# Patient Record
Sex: MALE | Race: WHITE | Employment: FULL TIME | URBAN - NONMETROPOLITAN AREA
[De-identification: names, ages, dates, MRNs, and addresses within clinical notes are randomized per-mention and may not be internally consistent; named-entity substitution may affect disease eponyms.]

---

## 2019-02-22 ENCOUNTER — HOSPITAL ENCOUNTER (OUTPATIENT)
Age: 64
Setting detail: SPECIMEN
Discharge: HOME | End: 2019-02-22
Payer: COMMERCIAL

## 2019-02-22 PROCEDURE — 88305 TISSUE EXAM BY PATHOLOGIST: CPT

## 2020-01-07 ENCOUNTER — HOSPITAL ENCOUNTER (OUTPATIENT)
Dept: AUDIOLOGY | Age: 65
Discharge: HOME | End: 2020-01-07
Payer: MEDICARE

## 2020-01-07 PROCEDURE — 92567 TYMPANOMETRY: CPT | Performed by: AUDIOLOGIST

## 2020-01-07 PROCEDURE — 92557 COMPREHENSIVE HEARING TEST: CPT | Performed by: AUDIOLOGIST

## 2020-01-07 NOTE — PROGRESS NOTES
AUDIOLOGICAL EVALUATION      REASON FOR TESTING:  Patient reports constant bilateral tinnitus that is getting worse. He notes possible hearing loss as well. He denies any otalgia, aural pressure and vertigo. A history of occupational noise exposure was reported. Mr. Olimpia Cutler wears hearing protection devices now, but did not earlier in his career. OTOSCOPY: WNL for both ears. AUDIOGRAM        Reliability: Good  Audiometer Used:  GSI-61      COMMENTS: Mild, sloping to severe sensorineural hearing loss for the left ear. Moderate, sloping to severe sensorineural hearing loss for the right ear. Asymmetrical hearing at 250Hz-2KHz, with the right ear being worse. Asymmetrical speech discrimination ability as well. Speech discrimination ability is excellent at 100% for the left ear and fair at 76% for the right ear. Tympanometry revealed normal peak pressure and normal middle ear compliance for both ears. RECOMMENDATION(S):   1- ENT consult and imaging are recommended due to the asymmetrical sensorineural hearing loss in order to rule out a retrocochlear lesion. The patient declined ENT consult and further testing. 2- Binaural amplification is recommended. Given that the patient declines the abovementioned recommendation, he will need to sign a waiver of medical clearance. Discussed hearing aid options with the patient and his wife. Reviewed styles and technology levels. Decided on [employer] M90 RT RICs in P5 color with #2 receivers. Hearing aid fitting is scheduled for 1/30/20.  3- Annual audiometry for monitoring purposes or sooner if any changes are noted in hearing ability.

## 2020-01-30 ENCOUNTER — HOSPITAL ENCOUNTER (OUTPATIENT)
Dept: AUDIOLOGY | Age: 65
Discharge: HOME | End: 2020-01-30

## 2020-01-30 PROCEDURE — V5261 HEARING AID, DIGIT, BIN, BTE: HCPCS | Performed by: AUDIOLOGIST

## 2020-01-30 PROCEDURE — V5267 HEARING AID SUP/ACCESS/DEV: HCPCS | Performed by: AUDIOLOGIST

## 2020-01-30 PROCEDURE — V5160 DISPENSING FEE BINAURAL: HCPCS | Performed by: AUDIOLOGIST

## 2020-01-30 NOTE — PROGRESS NOTES
[facility]#: [id number]  ACCT#: [de-identified]    DIAGNOSIS: Asymmetrical sensorineural hearing loss. NEW HEARING AID FITTING: Dispensed Phonak Audeo [address] 90 RT LAURA hearing aids for both ears with TV connector and D-Dry. Paired the patient's hearing aids to his phone and with the TV connector. Explained care, use and insertion. Programmed. Hearing aid fitting  recheck scheduled for 2/20/2020.

## 2020-02-20 ENCOUNTER — HOSPITAL ENCOUNTER (OUTPATIENT)
Dept: AUDIOLOGY | Age: 65
Discharge: HOME | End: 2020-02-20

## 2020-02-20 PROCEDURE — 9990000010 HC NO CHARGE VISIT: Performed by: AUDIOLOGIST

## 2020-02-20 NOTE — PROGRESS NOTES
TWO WEEK CHECK/AIDED AUDIO: The patient does not notice significant improvement with his hearing aids. He is still bothered by the tinnitus. He also states that his ears become sore towards the later part of the day. Changed the domes to the \"B\" technology small domes. The patient noted improvement in comfort. Re-ran feedback test.     AIDED TESTING:  Real ear to  measures were obtained during today's appointment. The [employer] Real Ear system was used to perform the RECD measures. The hearing aid was reprogrammed to match appropriate targets for MPO, soft, medium and loud stimuli with speech mapping based on 1/7/20 audiological data. The measures were as follows. RECD Measures (measured in dBSPL):  RIGHT EAR:  250Hz -3, 500Hz 3, 750Hz 4 , 1000Hz 5, 1500Hz 7, 2000Hz 7, 3000Hz 7, 4000Hz 4, 6000 2. LEFT EAR:  250Hz -14, 500Hz -1, 750Hz 7, 1000Hz 9, 1500Hz 8,  2000Hz 9, 3000Hz 7, 4000Hz 5, 6000 -3. Aided responses suggest: appropriate amplification, except at [facility], with the hearing aids set to target. The patient could not tolerate these settings so the overall gain was decreased x 2. The hearing aids were not maintaining connection with the patient's phone, so they were unpaired and re-paired. Also, uninstalled the camila and re-installed it. There was still connectivity issues. The patient was instructed to turn his phone off tonight and try to re-pair them. He will call tomorrow if he has any further issues with the connection with his phone. Scheduled follow up visit for 3/10/20.

## 2020-03-17 ENCOUNTER — HOSPITAL ENCOUNTER (OUTPATIENT)
Dept: AUDIOLOGY | Age: 65
Discharge: HOME | End: 2020-03-17

## 2020-03-17 PROCEDURE — 9990000010 HC NO CHARGE VISIT: Performed by: AUDIOLOGIST

## 2020-03-20 ENCOUNTER — HOSPITAL ENCOUNTER (OUTPATIENT)
Dept: CT IMAGING | Age: 65
Discharge: HOME | End: 2020-03-20
Payer: MEDICARE

## 2020-03-20 ENCOUNTER — HOSPITAL ENCOUNTER (OUTPATIENT)
Dept: NUCLEAR MEDICINE | Age: 65
Discharge: HOME | End: 2020-03-20
Payer: MEDICARE

## 2020-03-20 LAB — POC CREATININE WHOLE BLOOD: 1.1 MG/DL (ref 0.5–1.2)

## 2020-03-20 PROCEDURE — A9503 TC99M MEDRONATE: HCPCS | Performed by: NURSE PRACTITIONER

## 2020-03-20 PROCEDURE — 82565 ASSAY OF CREATININE: CPT

## 2020-03-20 PROCEDURE — 6360000004 HC RX CONTRAST MEDICATION: Performed by: NURSE PRACTITIONER

## 2020-03-20 PROCEDURE — 74177 CT ABD & PELVIS W/CONTRAST: CPT

## 2020-03-20 PROCEDURE — 3430000000 HC RX DIAGNOSTIC RADIOPHARMACEUTICAL: Performed by: NURSE PRACTITIONER

## 2020-03-20 PROCEDURE — 78306 BONE IMAGING WHOLE BODY: CPT

## 2020-03-20 RX ORDER — TC 99M MEDRONATE 20 MG/10ML
27.8 INJECTION, POWDER, LYOPHILIZED, FOR SOLUTION INTRAVENOUS
Status: COMPLETED | OUTPATIENT
Start: 2020-03-20 | End: 2020-03-20

## 2020-03-20 RX ADMIN — IOPAMIDOL 85 ML: 755 INJECTION, SOLUTION INTRAVENOUS at 13:30

## 2020-03-20 RX ADMIN — TC 99M MEDRONATE 27.8 MILLICURIE: 20 INJECTION, POWDER, LYOPHILIZED, FOR SOLUTION INTRAVENOUS at 08:55

## 2020-04-02 ENCOUNTER — HOSPITAL ENCOUNTER (EMERGENCY)
Age: 65
Discharge: HOME | End: 2020-04-02
Attending: EMERGENCY MEDICINE
Payer: MEDICARE

## 2020-04-02 VITALS
TEMPERATURE: 98.1 F | RESPIRATION RATE: 16 BRPM | HEART RATE: 82 BPM | WEIGHT: 200 LBS | SYSTOLIC BLOOD PRESSURE: 142 MMHG | DIASTOLIC BLOOD PRESSURE: 88 MMHG | OXYGEN SATURATION: 98 % | BODY MASS INDEX: 27.12 KG/M2

## 2020-04-02 LAB
AMORPHOUS: NORMAL
BACTERIA: NORMAL
BILIRUBIN URINE: NEGATIVE
BLOOD, URINE: ABNORMAL
CASTS UA: NORMAL /LPF
CHARACTER, URINE: ABNORMAL
COLOR: YELLOW
CRYSTALS, UA: NORMAL
EPITHELIAL CELLS, UA: NORMAL /HPF
GLUCOSE, URINE: NEGATIVE MG/DL
KETONES, URINE: 15
LEUKOCYTE ESTERASE, URINE: ABNORMAL
MUCUS: NORMAL
NITRITE, URINE: NEGATIVE
PH UA: 5.5 (ref 5–9)
PROTEIN UA: 30 MG/DL
RBC URINE: > 200 /HPF
SPECIFIC GRAVITY UA: >= 1.03 (ref 1–1.03)
UROBILINOGEN, URINE: 0.2 EU/DL (ref 0.2–1)
WBC UA: NORMAL /HPF

## 2020-04-02 PROCEDURE — 99283 EMERGENCY DEPT VISIT LOW MDM: CPT

## 2020-04-02 PROCEDURE — 6370000000 HC RX 637 (ALT 250 FOR IP): Performed by: EMERGENCY MEDICINE

## 2020-04-02 PROCEDURE — 81003 URINALYSIS AUTO W/O SCOPE: CPT

## 2020-04-02 PROCEDURE — 51702 INSERT TEMP BLADDER CATH: CPT

## 2020-04-02 PROCEDURE — 81001 URINALYSIS AUTO W/SCOPE: CPT

## 2020-04-02 PROCEDURE — 2709999900 HC NON-CHARGEABLE SUPPLY

## 2020-04-02 RX ORDER — TAMSULOSIN HYDROCHLORIDE 0.4 MG/1
0.4 CAPSULE ORAL ONCE
Status: COMPLETED | OUTPATIENT
Start: 2020-04-02 | End: 2020-04-02

## 2020-04-02 RX ADMIN — TAMSULOSIN HYDROCHLORIDE 0.4 MG: 0.4 CAPSULE ORAL at 21:35

## 2020-04-02 ASSESSMENT — PAIN DESCRIPTION - ORIENTATION: ORIENTATION: LOWER

## 2020-04-02 ASSESSMENT — PAIN DESCRIPTION - LOCATION: LOCATION: ABDOMEN

## 2020-04-02 ASSESSMENT — PAIN SCALES - GENERAL: PAINLEVEL_OUTOF10: 7

## 2020-04-03 NOTE — ED TRIAGE NOTES
Pt states had his prostate removed last week  Only urinating a small amount the last 2 days. Low abdominal pain reported.

## 2022-06-27 ENCOUNTER — HOSPITAL ENCOUNTER (EMERGENCY)
Age: 67
Discharge: HOME | End: 2022-06-27
Payer: MEDICARE

## 2022-06-27 VITALS
SYSTOLIC BLOOD PRESSURE: 163 MMHG | BODY MASS INDEX: 27.09 KG/M2 | HEIGHT: 72 IN | WEIGHT: 200 LBS | HEART RATE: 83 BPM | DIASTOLIC BLOOD PRESSURE: 76 MMHG | TEMPERATURE: 98.1 F | OXYGEN SATURATION: 95 % | RESPIRATION RATE: 18 BRPM

## 2022-06-27 DIAGNOSIS — S01.511A LIP LACERATION, INITIAL ENCOUNTER: Primary | ICD-10-CM

## 2022-06-27 PROCEDURE — 12051 INTMD RPR FACE/MM 2.5 CM/<: CPT | Performed by: NURSE PRACTITIONER

## 2022-06-27 PROCEDURE — 90715 TDAP VACCINE 7 YRS/> IM: CPT | Performed by: NURSE PRACTITIONER

## 2022-06-27 PROCEDURE — 90471 IMMUNIZATION ADMIN: CPT | Performed by: NURSE PRACTITIONER

## 2022-06-27 PROCEDURE — 6360000002 HC RX W HCPCS: Performed by: NURSE PRACTITIONER

## 2022-06-27 PROCEDURE — 99214 OFFICE O/P EST MOD 30 MIN: CPT

## 2022-06-27 RX ORDER — CEPHALEXIN 500 MG/1
500 CAPSULE ORAL 3 TIMES DAILY
Qty: 15 CAPSULE | Refills: 0 | Status: SHIPPED | OUTPATIENT
Start: 2022-06-27 | End: 2022-07-02

## 2022-06-27 RX ADMIN — TETANUS TOXOID, REDUCED DIPHTHERIA TOXOID AND ACELLULAR PERTUSSIS VACCINE, ADSORBED 0.5 ML: 5; 2.5; 8; 8; 2.5 SUSPENSION INTRAMUSCULAR at 19:31

## 2022-06-27 ASSESSMENT — PAIN - FUNCTIONAL ASSESSMENT: PAIN_FUNCTIONAL_ASSESSMENT: NONE - DENIES PAIN

## 2022-06-27 ASSESSMENT — ENCOUNTER SYMPTOMS
NAUSEA: 0
VOMITING: 0

## 2022-06-28 NOTE — ED NOTES
PT GIVEN DISCHARGE INSTRUCTIONS, VERBALIZES UNDERSTANDING. PT ASSESSMENT UNCHANGED, DISCHARGED IN STABLE CONDITION.         Lindsey Paulson RN  06/27/22 205

## 2022-06-28 NOTE — ED PROVIDER NOTES
[address]  Urgent Care Encounter       CHIEF COMPLAINT       Chief Complaint   Patient presents with    Laceration     pry bar slipped and hit him in the left upper lip       Nurses Notes reviewed and I agree except as noted in the HPI. HISTORY OF PRESENT ILLNESS   Cash Arizmendi is a 75 y.o. male who presents with a laceration to the left lower lip. 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 Bleeding was controlled with direct pressure. He denies any pain to his teeth. Patient is unsure of his last tetanus shot. The history is provided by the patient. REVIEW OF SYSTEMS     Review of Systems   Constitutional: Negative for fever. HENT: Negative for dental problem. Gastrointestinal: Negative for nausea and vomiting. Skin: Positive for wound. Neurological: Negative for numbness. PAST MEDICAL HISTORY         Diagnosis Date    Cancer [facility])     Hyperlipidemia        SURGICALHISTORY     Patient  has a past surgical history that includes Appendectomy; Colonoscopy; and Prostatectomy. CURRENT MEDICATIONS       Discharge Medication List as of 6/27/2022  8:56 PM      CONTINUE these medications which have NOT CHANGED    Details   Ezetimibe-Simvastatin (VYTORIN PO) Take 10 mg by mouth daily. ALLERGIES     Patient is has No Known Allergies. Patients   Immunization History   Administered Date(s) Administered    Tdap (Boostrix, Adacel) 06/27/2022       FAMILY HISTORY     Patient's family history is not on file. SOCIAL HISTORY     Patient  reports that he has never smoked. He has never used smokeless tobacco. He reports current alcohol use. He reports that he does not use drugs.     PHYSICAL EXAM     ED TRIAGE VITALS  BP: (!) 163/76, Temp: 98.1 °F (36.7 °C), Heart Rate: 83, Resp: 18, SpO2: 95 %,Estimated body mass index is 27.12 kg/m² as calculated from the following:    Height as of this encounter: 6' (1.829 m). Weight as of this encounter: 200 lb (90.7 kg). ,No LMP for male patient. Physical Exam  Vitals and nursing note reviewed. Constitutional:       General: He is not in acute distress. Appearance: He is well-developed. HENT:      Head: Normocephalic. Laceration (1.5 cm laceration to the left lower lip crossing the vermilion border. 1 cm laceration to the mucosa on the inside of the left lower lip. Scant bleeding noted. No foreign bodies. ) present. Pulmonary:      Effort: Pulmonary effort is normal. No respiratory distress. Skin:     General: Skin is warm and dry. Neurological:      General: No focal deficit present. Mental Status: He is alert and oriented to person, place, and time. Psychiatric:         Mood and Affect: Mood normal.         Speech: Speech normal.         Behavior: Behavior normal. Behavior is cooperative. DIAGNOSTIC RESULTS     Labs:No results found for this visit on 06/27/22. IMAGING:    No orders to display         EKG:      URGENT CARE COURSE:     Vitals:    06/27/22 1901   BP: (!) 163/76   Pulse: 83   Resp: 18   Temp: 98.1 °F (36.7 °C)   TempSrc: Temporal   SpO2: 95%   Weight: 200 lb (90.7 kg)   Height: 6' (1.829 m)       Medications   Tetanus-Diphth-Acell Pertussis (BOOSTRIX) injection 0.5 mL (0.5 mLs IntraMUSCular Given 6/27/22 1931)            PROCEDURES:  Lac Repair    Date/Time: 6/27/2022 9:02 PM  Performed by: REYNALDO Proctor CNP  Authorized by: REYNALDO Proctor CNP     Consent:     Consent obtained:  Verbal    Consent given by:  Patient    Risks discussed:  Infection and poor cosmetic result    Alternatives discussed:  No treatment  Anesthesia (see MAR for exact dosages):      Anesthesia method:  Local infiltration    Local anesthetic:  Lidocaine 1% w/o epi  Laceration details:     Location:  Lip    Lip location:  Lower lip, full thickness    Height of lip laceration:  Up to half vertical height    Length (cm):  1.5    Laceration depth: Full-thickness. Repair type:     Repair type: Intermediate  Pre-procedure details:     Preparation:  Patient was prepped and draped in usual sterile fashion  Exploration:     Hemostasis achieved with:  Direct pressure    Wound exploration: wound explored through full range of motion and entire depth of wound probed and visualized      Contaminated: no    Treatment:     Area cleansed with:  Betadine (wound wash)    Visualized foreign bodies/material removed: no    Mucous membrane repair:     Suture size:  5-0    Suture material:  Vicryl    Suture technique:  Simple interrupted    Number of sutures:  2  Skin repair:     Repair method:  Sutures    Suture size:  5-0    Suture material:  Nylon    Suture technique:  Simple interrupted    Number of sutures:  3  Approximation:     Approximation:  Close    Vermilion border: well-aligned    Post-procedure details:     Dressing:  Open (no dressing)    Patient tolerance of procedure: Tolerated well, no immediate complications        FINAL IMPRESSION      1. Lip laceration, initial encounter          DISPOSITION/ PLAN     Patient presents with a full-thickness lip laceration. Wound was repaired as described above. Local wound care and signs and symptoms of infection discussed with the patient. Prophylactic cephalexin. Return in 5 to 7 days for removal of sutures. Patient was instructed that the sutures inside the lip will dissolve on their own and to not pull on them. Follow-up sooner with any concerns or signs of infection. Further instructions were outlined verbally and in the patient's discharge instructions. All the patient's questions were answered. The patient/parent agreed with the plan and was discharged from the [facility] in good condition.       PATIENT REFERRED TO:  Amy Frey MD  [facility]. [address] / [place] [address]      DISCHARGE MEDICATIONS:  Discharge Medication List as of 6/27/2022  8:56 PM      START taking these medications Details   cephALEXin (KEFLEX) 500 MG capsule Take 1 capsule by mouth 3 times daily for 5 days, Disp-15 capsule, R-0Normal             Discharge Medication List as of 6/27/2022  8:56 PM          Discharge Medication List as of 6/27/2022  8:56 PM          Uvaldo Peabody Case, APRN - CNP    (Please note that portions of this note were completed with a voice recognition program. Efforts were made to edit the dictations but occasionally words are mis-transcribed.)         Uvaldo Peabody Case, APRN - CNP  06/27/22 5853

## 2022-07-06 ENCOUNTER — HOSPITAL ENCOUNTER (EMERGENCY)
Age: 67
Discharge: HOME | End: 2022-07-06
Payer: MEDICARE

## 2022-07-06 VITALS
OXYGEN SATURATION: 97 % | TEMPERATURE: 97.2 F | RESPIRATION RATE: 16 BRPM | HEART RATE: 82 BPM | SYSTOLIC BLOOD PRESSURE: 156 MMHG | DIASTOLIC BLOOD PRESSURE: 83 MMHG

## 2022-07-06 DIAGNOSIS — Z48.02 ENCOUNTER FOR REMOVAL OF SUTURES: Primary | ICD-10-CM

## 2022-07-06 PROCEDURE — 99213 OFFICE O/P EST LOW 20 MIN: CPT

## 2022-07-06 PROCEDURE — 99212 OFFICE O/P EST SF 10 MIN: CPT | Performed by: NURSE PRACTITIONER

## 2024-01-29 ENCOUNTER — TELEPHONE (OUTPATIENT)
Dept: AUDIOLOGY | Age: 69
End: 2024-01-29

## 2024-01-29 ENCOUNTER — HOSPITAL ENCOUNTER (OUTPATIENT)
Dept: AUDIOLOGY | Age: 69
Discharge: HOME | End: 2024-01-29

## 2024-01-29 PROCEDURE — V5267 HEARING AID SUP/ACCESS/DEV: HCPCS | Performed by: AUDIOLOGIST

## 2024-01-29 PROCEDURE — V5014 HEARING AID REPAIR/MODIFYING: HCPCS | Performed by: AUDIOLOGIST

## 2024-01-29 NOTE — TELEPHONE ENCOUNTER
Patient's co-worker dropped off both hearing aids because of broken .  The hearing aids were given to Kirsty to be checked.  The hearing aids are out of warranty.

## 2024-01-29 NOTE — TELEPHONE ENCOUNTER
Both hearing aids cleaned. A new  was installed for the left hearing aid and the a new  filter, retention wire and dome were installed for the right hearing aid. No issues noted with listening check for both hearing aids. The patient was billed $130.00 for the new , clean/check of both hearing aids and for 48 filters.

## 2024-01-29 NOTE — PROGRESS NOTES
ACCOUNT #: [id number]    DIAGNOSIS: H90.3    HEARING AID PROBLEM: The patient's Phonak Audeo M90-RT hearing aids (2020) were brought in by a co-worker due to the left hearing aid having a broken  wire. Both hearing aids cleaned. A new  was installed for the left hearing aid and the a new  filter, retention wire and dome were installed for the right hearing aid. No issues noted with listening check for both hearing aids. The patient was billed $130.00 for the new , clean/check of both hearing aids and for 48 filters.

## 2024-06-24 ENCOUNTER — HOSPITAL ENCOUNTER (OUTPATIENT)
Dept: AUDIOLOGY | Age: 69
Discharge: HOME | End: 2024-06-24

## 2024-06-24 ENCOUNTER — TELEPHONE (OUTPATIENT)
Dept: AUDIOLOGY | Age: 69
End: 2024-06-24

## 2024-06-24 PROCEDURE — V5014 HEARING AID REPAIR/MODIFYING: HCPCS | Performed by: AUDIOLOGIST

## 2024-06-24 NOTE — PROGRESS NOTES
ACCOUNT #: [id number]    DIAGNOSIS: Sensorineural hearing loss of both ears.      HEARING AID PROBLEM (walk in): Patient's employee brought in his right hearing aid-  wire broken into two pieces. Replaced the . Vacuumed debris from the  port. Brushed debris from hearing aid microphones. Replaced retention wire and dome. Hearing aid battery was not charged, so could not complete listening check. Patient should return immediately if hearing aid is not working. Billed $100.00 for new /repair.

## 2024-06-24 NOTE — TELEPHONE ENCOUNTER
Walk-In -hearing aid problem.  Patient's hearing aid was brought in because the  broke.  The hearing aid was given to Autumn.